# Patient Record
Sex: MALE | Race: OTHER | Employment: OTHER | URBAN - METROPOLITAN AREA
[De-identification: names, ages, dates, MRNs, and addresses within clinical notes are randomized per-mention and may not be internally consistent; named-entity substitution may affect disease eponyms.]

---

## 2019-06-07 ENCOUNTER — HOSPITAL ENCOUNTER (OUTPATIENT)
Dept: ULTRASOUND IMAGING | Age: 64
Discharge: HOME | End: 2019-06-09
Payer: COMMERCIAL

## 2019-06-07 DIAGNOSIS — R10.11 RIGHT UPPER QUADRANT PAIN: ICD-10-CM

## 2019-06-07 PROCEDURE — 76705 ECHO EXAM OF ABDOMEN: CPT

## 2023-10-02 ENCOUNTER — HOSPITAL ENCOUNTER
Dept: HOSPITAL 101 - PST | Age: 68
Discharge: HOME | End: 2023-10-02
Payer: COMMERCIAL

## 2023-10-02 VITALS
OXYGEN SATURATION: 97 % | SYSTOLIC BLOOD PRESSURE: 146 MMHG | RESPIRATION RATE: 18 BRPM | TEMPERATURE: 97.16 F | DIASTOLIC BLOOD PRESSURE: 90 MMHG | HEART RATE: 66 BPM

## 2023-10-02 DIAGNOSIS — Z87.891: ICD-10-CM

## 2023-10-02 DIAGNOSIS — N40.1: ICD-10-CM

## 2023-10-02 DIAGNOSIS — Z79.899: ICD-10-CM

## 2023-10-02 DIAGNOSIS — Z01.812: Primary | ICD-10-CM

## 2023-10-02 DIAGNOSIS — Z01.810: ICD-10-CM

## 2023-10-02 DIAGNOSIS — R00.2: ICD-10-CM

## 2023-10-02 DIAGNOSIS — M54.9: ICD-10-CM

## 2023-10-02 DIAGNOSIS — I10: ICD-10-CM

## 2023-10-02 DIAGNOSIS — R12: ICD-10-CM

## 2023-10-02 DIAGNOSIS — M54.2: ICD-10-CM

## 2023-10-02 DIAGNOSIS — G47.30: ICD-10-CM

## 2023-10-02 DIAGNOSIS — N13.8: ICD-10-CM

## 2023-10-02 LAB
ADD MANUAL DIFF: NO
ANION GAP: 14.9
BLOOD UREA NITROGEN: 34 MG/DL (ref 7–18)
CALCIUM: 9.1 MG/DL (ref 8.5–10.1)
CARBON DIOXIDE: 23.1 MMOL/L (ref 21–32)
CHLORIDE: 105 MMOL/L (ref 98–107)
CO2 BLD-SCNC: 23.1 MMOL/L (ref 21–32)
ESTIMATED GFR (AFRICAN AMERICA: >60 (ref 60–?)
ESTIMATED GFR (NON-AFRICAN AME: >60 (ref 60–?)
GLUCOSE BLD-MCNC: 160 MG/DL (ref 74–106)
HCT VFR BLD CALC: 39 % (ref 42–54)
HEMATOCRIT: 39 % (ref 42–54)
HEMOGLOBIN: 13.7 G/DL (ref 14–18)
IMMATURE GRANULOCYTES ABS AUTO: 0.02 10^3/UL (ref 0–0.03)
IMMATURE GRANULOCYTES PCT AUTO: 0.3 % (ref 0–0.5)
LYMPHOCYTES  ABSOLUTE AUTO: 1.1 10^3/UL (ref 1.2–3.8)
MCV RBC: 91.1 FL (ref 80–94)
MEAN CORPUSCULAR HEMOGLOBIN: 32 PG (ref 25.9–34)
MEAN CORPUSCULAR HGB CONC: 35.1 G/DL (ref 29.9–35.2)
MEAN CORPUSCULAR VOLUME: 91.1 FL (ref 80–94)
PLATELET # BLD: 247 10^3/UL (ref 150–450)
PLATELET COUNT: 247 10^3/UL (ref 150–450)
POTASSIUM SERPLBLD-SCNC: 4 MMOL/L (ref 3.5–5.1)
POTASSIUM: 4 MMOL/L (ref 3.5–5.1)
RED BLOOD COUNT: 4.28 10^6/UL (ref 4.7–6.1)
SODIUM BLD-SCNC: 139 MMOL/L (ref 136–145)
SODIUM: 139 MMOL/L (ref 136–145)
WBC # BLD: 7.3 10^3/UL (ref 4–11)
WHITE BLOOD COUNT: 7.3 10^3/UL (ref 4–11)

## 2023-10-02 PROCEDURE — 93005 ELECTROCARDIOGRAM TRACING: CPT

## 2023-10-02 PROCEDURE — 85025 COMPLETE CBC W/AUTO DIFF WBC: CPT

## 2023-10-02 PROCEDURE — G0463 HOSPITAL OUTPT CLINIC VISIT: HCPCS

## 2023-10-02 PROCEDURE — 80048 BASIC METABOLIC PNL TOTAL CA: CPT

## 2023-10-02 PROCEDURE — 36415 COLL VENOUS BLD VENIPUNCTURE: CPT

## 2023-10-02 PROCEDURE — 85730 THROMBOPLASTIN TIME PARTIAL: CPT

## 2023-10-02 PROCEDURE — 85610 PROTHROMBIN TIME: CPT

## 2023-10-02 NOTE — ECG_ITS
[facility] 
                                        
                                       Test Date:    2023-10-02 
Pat Name:     ANH LOONEY          Department:    
Patient ID:   [id number]               Room:         - 
Gender:       Male                     Technician:    
:          1955               Requested By: JADEN PATTON 
Order Number: [id number]              Reading MD:   LISA CARPIO 
                                 Measurements 
Intervals                              Axis           
Rate:         60                       P:            66 
LA:           177                      QRS:          -39 
QRSD:         97                       T:            29 
QT:           445                                     
QTc:          446                                     
                           Interpretive Statements 
SINUS RHYTHM 
MARKED LEFT AXIS DEVIATION [QRS AXIS < -30] 
No previous ECG available for comparison 
Electronically Signed On 10-3-2023 7:04:40 EDT by LISA CARPIO

## 2023-10-02 NOTE — P.GSHP_ITS
History of Present Illness    
History of Present Illness    
Chief complaint: BPH W/ OBSTRUCTION    
Narrative:     
Patient presents for preadmission testing. The patient reports urinary   
frequency, urgency, weak stream, incomplete bladder emptying, and nocturia.   
Patient denies dysuria, hematuria, abdominal pain, nausea, vomiting, fever, or   
any other complaints.    
    
Review of Systems    
    
    
ROS      
    
 Narrative     
REVIEW OF SYSTEMS:  Negative except as stated in HPI, ten or more systems   
reviewed.    
              
   Constitutional:  No fever , chills, weakness    
         
     ENT:  No sore throat or epistaxis    
    
     Cardiovascular: No edema, chest pain, palpitations, or activity intolerance    
    
     Respiratory:  No shortness of breath, cough, or wheezing    
    
     Musculoskeletal:  No joint pain or swelling    
    
     Gastrointestinal:  No abdominal pain, constipation, diarrhea, or vomiting    
        
     Genitourinary:  No dysuria or hematuria    
          
     Neurological:  No numbness, tingling, weakness, or headache    
     
     Psychiatric:  No mood changes    
       
    
    
PFSH    
PFSH    
Medical History (Updated 10/02/23 @ 11:48 by Zaida Bailey NP)    
    
Back pain    
   ?M54.9 - Dorsalgia, unspecified (ICD-10)    
Benign prostatic hyperplasia with urinary obstruction and other lower urinary   
tract symptoms    
   ?N40.1 - Benign prostatic hyperplasia with lower urinary tract symptoms (ICD-  
   10)    
   ?N13.8 - Other obstructive and reflux uropathy (ICD-10)    
BPH (benign prostatic hyperplasia)    
   ?N40.0 - Benign prostatic hyperplasia without lower urinary tract symptoms   
   (ICD-10)    
Heartburn    
   ?R12 - Heartburn (ICD-10)    
Hypertension    
   ?I10 - Essential (primary) hypertension (ICD-10)    
Neck pain    
   ?M54.2 - Cervicalgia (ICD-10)    
Palpitations    
   ?R00.2 - Palpitations (ICD-10)    
Sleep apnea    
   ?G47.30 - Sleep apnea, unspecified (ICD-10)    
    
    
Surgical History (Updated 10/02/23 @ 11:15 by Zaida Bailey NP)    
    
H/O cervical spinal arthrodesis    
   ?Z98.1 - Arthrodesis status (ICD-10)    
History of colonoscopy    
   ?Z98.890 - Other specified postprocedural states (ICD-10)    
History of spinal surgery    
   ?Z98.890 - Other specified postprocedural states (ICD-10)    
    
    
Family History (Updated 10/02/23 @ 11:15 by Zaida Bailey NP)    
Other   Family history of COPD (chronic obstructive pulmonary disease)    
   Family history of diabetes mellitus    
   Family history of pancreatic cancer    
   Family history of prostate cancer    
    
    
    
Social History (Updated 10/02/23 @ 11:11 by Zaida Bailey NP)    
Within the past year, how often did you have a drink containing alcohol:  2-3   
times a week     
Smoking status:  Former smoker     
Non-prescribed substance use:  denies use     
Highest level of school completed/degree received:  high school graduate     
    
    
    
Meds    
Home Medications and Allergies    
                                Home Medications    
    
    
    
 Medication  Instructions  Recorded  Confirmed  Type    
     
acetaminophen 325 mg tablet 325 mg PO Q6H PRN pain 10/02/23 10/02/23 History    
    
(Tylenol)        
     
gabapentin 100 mg capsule 100 mg PO Q8H 10/02/23 10/02/23 History    
     
lisinopril 20 1 tab PO DAILY 10/02/23 10/02/23 History    
    
mg-hydrochlorothiazide 12.5 mg        
    
tablet        
     
sildenafil 50 mg tablet 50 mg PO DAILY PRN sexual activity 10/02/23 10/02/23   
History    
     
tamsulosin 0.4 mg capsule (Flomax) 0.4 mg PO DAILY 10/02/23 10/02/23 History    
    
    
    
                                    Allergies    
    
    
    
Allergy/AdvReac Type Severity Reaction Status Date / Time    
     
No Known Drug Allergies Allergy   Verified 10/02/23 11:08    
    
    
    
    
Exam    
Narrative    
Exam Narrative:     
Constitutional: Awake, alert, comfortable, well-appearing, nontoxic,   
interactive, vital signs as charted    
Head: Normocephalic, atraumatic    
Neck: Supple, normal appearance, normal range of motion, no meningeal signs, no   
lymphadenopathy    
Respiratory: No respiratory distress, breath sounds clear    
Cardiovascular: Regular rate and rhythm, strong and regular heart tones    
Abdomen: Nontender, normal bowel sounds, soft, no CVA tenderness    
Musculoskeletal: Normal gait, no swelling or edema    
Skin: No rashes or induration, no lesions, only visible skin inspected    
Neuro: No neurological deficits, normal sensation    
Psychiatric: Oriented ?3, normal affect    
    
    
Assessment and Plan    
Assessment and Plan    
(1) BPH (benign prostatic hyperplasia):     
(2) Benign prostatic hyperplasia with urinary obstruction and other lower   
urinary tract symptoms:     
    
Plan    
Cystoscopy, TUIP, possible TURP Scheduled with Dr. Lazaro 10/12/2023.

## 2023-10-12 ENCOUNTER — HOSPITAL ENCOUNTER (OUTPATIENT)
Dept: HOSPITAL 101 - SURGOUT | Age: 68
LOS: 1 days | Discharge: HOME | End: 2023-10-13
Payer: COMMERCIAL

## 2023-10-12 VITALS
DIASTOLIC BLOOD PRESSURE: 76 MMHG | TEMPERATURE: 97.4 F | OXYGEN SATURATION: 94 % | SYSTOLIC BLOOD PRESSURE: 119 MMHG | HEART RATE: 81 BPM | RESPIRATION RATE: 18 BRPM

## 2023-10-12 VITALS
RESPIRATION RATE: 18 BRPM | SYSTOLIC BLOOD PRESSURE: 122 MMHG | OXYGEN SATURATION: 94 % | TEMPERATURE: 97.52 F | DIASTOLIC BLOOD PRESSURE: 78 MMHG | HEART RATE: 79 BPM

## 2023-10-12 VITALS
RESPIRATION RATE: 14 BRPM | HEART RATE: 88 BPM | OXYGEN SATURATION: 95 % | DIASTOLIC BLOOD PRESSURE: 71 MMHG | SYSTOLIC BLOOD PRESSURE: 113 MMHG | TEMPERATURE: 97.9 F

## 2023-10-12 VITALS
OXYGEN SATURATION: 94 % | TEMPERATURE: 98.96 F | RESPIRATION RATE: 20 BRPM | DIASTOLIC BLOOD PRESSURE: 54 MMHG | HEART RATE: 83 BPM | SYSTOLIC BLOOD PRESSURE: 102 MMHG

## 2023-10-12 VITALS
TEMPERATURE: 97.1 F | SYSTOLIC BLOOD PRESSURE: 112 MMHG | HEART RATE: 92 BPM | DIASTOLIC BLOOD PRESSURE: 72 MMHG | OXYGEN SATURATION: 97 % | RESPIRATION RATE: 12 BRPM

## 2023-10-12 VITALS — RESPIRATION RATE: 14 BRPM | HEART RATE: 88 BPM

## 2023-10-12 VITALS — OXYGEN SATURATION: 96 %

## 2023-10-12 VITALS
DIASTOLIC BLOOD PRESSURE: 84 MMHG | TEMPERATURE: 96.7 F | SYSTOLIC BLOOD PRESSURE: 146 MMHG | HEART RATE: 78 BPM | OXYGEN SATURATION: 97 % | RESPIRATION RATE: 16 BRPM

## 2023-10-12 VITALS — BODY MASS INDEX: 33.8 KG/M2 | BODY MASS INDEX: 32.6 KG/M2

## 2023-10-12 VITALS — OXYGEN SATURATION: 93 %

## 2023-10-12 DIAGNOSIS — N40.1: Primary | ICD-10-CM

## 2023-10-12 DIAGNOSIS — N52.9: ICD-10-CM

## 2023-10-12 DIAGNOSIS — Z79.899: ICD-10-CM

## 2023-10-12 DIAGNOSIS — Z87.891: ICD-10-CM

## 2023-10-12 DIAGNOSIS — M19.90: ICD-10-CM

## 2023-10-12 DIAGNOSIS — I10: ICD-10-CM

## 2023-10-12 DIAGNOSIS — Z98.1: ICD-10-CM

## 2023-10-12 LAB
INR: <0.93
PARTIAL THROMBOPLASTIN TIME: 27.6 SEC (ref 22.3–36.2)
PROTHROMBIN TIME: 9.7 SEC (ref 9–11.6)

## 2023-10-12 PROCEDURE — 88305 TISSUE EXAM BY PATHOLOGIST: CPT

## 2023-10-12 PROCEDURE — 52601 PROSTATECTOMY (TURP): CPT

## 2023-10-12 PROCEDURE — 36415 COLL VENOUS BLD VENIPUNCTURE: CPT

## 2023-10-12 PROCEDURE — 85610 PROTHROMBIN TIME: CPT

## 2023-10-12 PROCEDURE — 94761 N-INVAS EAR/PLS OXIMETRY MLT: CPT

## 2023-10-12 PROCEDURE — 85730 THROMBOPLASTIN TIME PARTIAL: CPT

## 2023-10-12 RX ADMIN — SODIUM CHLORIDE 3000 ML: 900 IRRIGANT IRRIGATION at 14:59

## 2023-10-12 RX ADMIN — CEFAZOLIN SODIUM 100 GM: 1 SOLUTION INTRAVENOUS at 19:35

## 2023-10-12 RX ADMIN — LEVOFLOXACIN 100 MG: 5 INJECTION, SOLUTION INTRAVENOUS at 11:12

## 2023-10-12 RX ADMIN — SODIUM CHLORIDE 80 ML: 900 INJECTION, SOLUTION INTRAVENOUS at 14:16

## 2023-10-12 RX ADMIN — GABAPENTIN 100 MG: 100 CAPSULE ORAL at 21:19

## 2023-10-12 RX ADMIN — CEFAZOLIN SODIUM 100 GM: 1 SOLUTION INTRAVENOUS at 14:13

## 2023-10-12 RX ADMIN — GABAPENTIN 100 MG: 100 CAPSULE ORAL at 14:12

## 2023-10-12 RX ADMIN — SOLIFENACIN SUCCINATE 10 MG: 10 TABLET, FILM COATED ORAL at 14:12

## 2023-10-12 RX ADMIN — SODIUM CHLORIDE 3000 ML: 900 IRRIGANT IRRIGATION at 14:58

## 2023-10-12 NOTE — P.URON_ITS
Urology Surgery Operative Note    
Operative Note    
Procedure Date: 10/12/23    
Time Out Performed: yes    
Pre-op Diagnosis: benign prostatic hypertrophy with L UTS refractory to   
medications    
Post-op Diagnosis: same as pre-op    
Procedures performed: #1. Urethral dilation with [place] sounds to 28 [place]. #2.  
Cystoscopy. #3. Transurethral resection of the prostate    
Anesthesia: General-LMA    
Primary Surgeon: Jacques Lazaro    
Complications:     
none    
Estimated blood loss (mL): 10    
Findings: high median bar and obstructing lateral lobes    
Specimens:     
prostate chips.    
Drains:     
22 [place] three-way coud? Hand in the bladder to CBI    
Indications for Procedures:     
this gentleman has significant bladder all at obstructive symptoms despite   
medications. Endoscopically he is obstructed in a trilobar fashion.   
Urodynamically he has low flow and elevated pressures. He is strongly desirous   
for a transurethral resection of the prostate. He has signed an informed consent  
after all risks were explained. Some of these risks include bleeding, infection,  
anesthesia, retrograde ejaculation, urinary incontinence both temporary and   
permanent and erectile dysfunction to name a few    
Detailed description of Procedure:     
The patient was brought to the operating room and placed on the operating room   
table in the supine position. SCDs were placed on the lower extremities and   
turned on and functioning during the entire case. Timeout was done by all   
parties in the room. We all agreed upon the patient's identification and the   
planned procedures for this patient. Genn. anesthesia was then administered. The  
patient was then repositioned into the modified dorsal lithotomy position. All   
pressure points were satisfactorily padded. Genitalia were sterilely prepped and  
draped in usual fashion.after I could not pass the resectoscope I then used the   
[place] sounds and dilated him from 20 Afghan up to 28 Afghan.I then started by   
passing a 26 [place] Olympus resectoscope with the standard bipolar loop   
electrode per urethra and into the bladder. The median bar was very high. The   
orifices were identified. I then started on the median bar and uniformly   
resected this down to the bladder neck level. I then resected posteriorly from   
the bladder neck to the veru level. The right and lateral lobes were then   
resected similarly. The anterior tissue was also resected similarly. I brought   
the scope back to the apex and open this up carefully. The resection bed was   
coagulated. The [employer] evacuator was used to get all the tissue chips out of the  
bladder and these were sent for permanent sections. With the scope at the apex   
the prostatic urethra and bladder neck were now wide open. There was no   
bleeding. There were no chips remaining in the bladder. The scope was then   
removed. I then placed a 22 [place] three-way coud? Hand catheter into the   
bladder. It was manually irrigated. 30 mL of fluid was placed in the balloon.   
CBI was started after the catheter was taped to traction. It irrigated clear. He  
was then transferred to a [facility] bed and wheeled to PACU in stable condition.

## 2023-10-12 NOTE — PM.URSON
Urology Surgery Operative Note
Operative Note
Procedure Date: 10/12/23
Time Out Performed: yes
Pre-op Diagnosis: benign prostatic hypertrophy with L UTS refractory to medications
Post-op Diagnosis: same as pre-op
Procedures performed: #1. Urethral dilation with Chloe sounds to 28 [place]. #2. Cystoscopy. #3. Transurethral resection of the prostate
Anesthesia: General-LMA
Primary Surgeon: Jacques Lazaro
Complications: 
none
Estimated blood loss (mL): 10
Findings: high median bar and obstructing lateral lobes
Specimens: 
prostate chips.
Drains: 
22 [place] three-way coud? Hand in the bladder to CBI
Indications for Procedures: 
this gentleman has significant bladder all at obstructive symptoms despite medications. Endoscopically he is obstructed in a trilobar fashion. Urodynamically he has low flow and elevated pressures. He is strongly desirous for a transurethral 
resection of the prostate. He has signed an informed consent after all risks were explained. Some of these risks include bleeding, infection, anesthesia, retrograde ejaculation, urinary incontinence both temporary and permanent and erectile 
dysfunction to name a few
Detailed description of Procedure: 
The patient was brought to the operating room and placed on the operating room table in the supine position. SCDs were placed on the lower extremities and turned on and functioning during the entire case. Timeout was done by all parties in the room. 
We all agreed upon the patient's identification and the planned procedures for this patient. Genn. anesthesia was then administered. The patient was then repositioned into the modified dorsal lithotomy position. All pressure points were 
satisfactorily padded. Genitalia were sterilely prepped and draped in usual fashion.after I could not pass the resectoscope I then used the Chloe sounds and dilated him from 20 [place] up to 28 [place].I then started by passing a 26 [place] Olympus 
resectoscope with the standard bipolar loop electrode per urethra and into the bladder. The median bar was very high. The orifices were identified. I then started on the median bar and uniformly resected this down to the bladder neck level. I then 
resected posteriorly from the bladder neck to the veru level. The right and lateral lobes were then resected similarly. The anterior tissue was also resected similarly. I brought the scope back to the apex and open this up carefully. The resection 
bed was coagulated. The [employer] evacuator was used to get all the tissue chips out of the bladder and these were sent for permanent sections. With the scope at the apex the prostatic urethra and bladder neck were now wide open. There was no bleeding. 
There were no chips remaining in the bladder. The scope was then removed. I then placed a 22 [place] three-way coud? Hand catheter into the bladder. It was manually irrigated. 30 mL of fluid was placed in the balloon. CBI was started after the 
catheter was taped to traction. It irrigated clear. He was then transferred to a [facility] bed and wheeled to PACU in stable condition.

## 2023-10-13 VITALS
HEART RATE: 78 BPM | RESPIRATION RATE: 18 BRPM | TEMPERATURE: 98.7 F | SYSTOLIC BLOOD PRESSURE: 93 MMHG | DIASTOLIC BLOOD PRESSURE: 56 MMHG | OXYGEN SATURATION: 92 %

## 2023-10-13 VITALS
SYSTOLIC BLOOD PRESSURE: 109 MMHG | HEART RATE: 70 BPM | RESPIRATION RATE: 20 BRPM | TEMPERATURE: 97.9 F | DIASTOLIC BLOOD PRESSURE: 70 MMHG | OXYGEN SATURATION: 97 %

## 2023-10-13 VITALS — OXYGEN SATURATION: 94 %

## 2023-10-13 VITALS — OXYGEN SATURATION: 95 %

## 2023-10-13 RX ADMIN — SOLIFENACIN SUCCINATE 10 MG: 10 TABLET, FILM COATED ORAL at 08:29

## 2023-10-13 RX ADMIN — SODIUM CHLORIDE 80 ML: 900 INJECTION, SOLUTION INTRAVENOUS at 03:30

## 2023-10-13 RX ADMIN — TAMSULOSIN HYDROCHLORIDE 0.4 MG: 0.4 CAPSULE ORAL at 08:29

## 2023-10-13 RX ADMIN — GABAPENTIN 100 MG: 100 CAPSULE ORAL at 06:23
